# Patient Record
Sex: FEMALE | Race: BLACK OR AFRICAN AMERICAN | URBAN - NONMETROPOLITAN AREA
[De-identification: names, ages, dates, MRNs, and addresses within clinical notes are randomized per-mention and may not be internally consistent; named-entity substitution may affect disease eponyms.]

---

## 2019-05-23 ENCOUNTER — OFFICE VISIT (OUTPATIENT)
Dept: RHEUMATOLOGY | Age: 29
End: 2019-05-23
Payer: MEDICARE

## 2019-05-23 VITALS
WEIGHT: 153.4 LBS | HEART RATE: 76 BPM | SYSTOLIC BLOOD PRESSURE: 128 MMHG | HEIGHT: 67 IN | OXYGEN SATURATION: 100 % | BODY MASS INDEX: 24.08 KG/M2 | DIASTOLIC BLOOD PRESSURE: 86 MMHG

## 2019-05-23 DIAGNOSIS — M62.81 MUSCLE WEAKNESS: ICD-10-CM

## 2019-05-23 DIAGNOSIS — M25.50 POLYARTHRALGIA: Primary | ICD-10-CM

## 2019-05-23 DIAGNOSIS — R76.8 ANA POSITIVE: ICD-10-CM

## 2019-05-23 PROCEDURE — G8420 CALC BMI NORM PARAMETERS: HCPCS | Performed by: INTERNAL MEDICINE

## 2019-05-23 PROCEDURE — 99244 OFF/OP CNSLTJ NEW/EST MOD 40: CPT | Performed by: INTERNAL MEDICINE

## 2019-05-23 PROCEDURE — G8427 DOCREV CUR MEDS BY ELIG CLIN: HCPCS | Performed by: INTERNAL MEDICINE

## 2019-05-23 RX ORDER — M-VIT,TX,IRON,MINS/CALC/FOLIC 27MG-0.4MG
1 TABLET ORAL DAILY
COMMUNITY

## 2019-05-23 RX ORDER — LEVOTHYROXINE SODIUM 0.03 MG/1
50 TABLET ORAL DAILY
COMMUNITY

## 2019-05-23 ASSESSMENT — ENCOUNTER SYMPTOMS
SHORTNESS OF BREATH: 1
BLOOD IN STOOL: 0
COUGH: 0
WHEEZING: 0
CONSTIPATION: 0
DIARRHEA: 1
NAUSEA: 0
EYE PAIN: 1
CHOKING: 0
VOMITING: 0
EYE REDNESS: 1

## 2019-05-23 NOTE — PROGRESS NOTES
the stools. - cyanosis w/ paresthesias of hands and feet. Upon cold exposure. + hair loss -- scalp and eye brows    -denies Photosenstivity,  dry mouth/dry eyes, oral/nasal sores, digital ulcerations, skin tightening, renal disease,foamy urination,  sz's, blood clots, AIHA,leukpenia/lymphopenia, thrombocytopenia,serositis,        Cancer screening: up to date     Review of Systems    Review of Systems   Constitutional: Negative for diaphoresis, fatigue and fever. HENT: Positive for mouth sores and tinnitus. Negative for congestion, hearing loss and nosebleeds. Eyes: Positive for pain and redness. Dryness, itching   Respiratory: Positive for shortness of breath. Negative for cough, choking and wheezing. Cardiovascular: Positive for chest pain (Right sternal -- non-pleuritic, no position changes). Gastrointestinal: Positive for diarrhea. Negative for blood in stool, constipation, nausea and vomiting. Genitourinary: Negative for difficulty urinating, frequency and hematuria. Musculoskeletal: Positive for myalgias. Skin: Negative for rash. Neurological: Positive for weakness. Negative for dizziness and headaches. Psychiatric/Behavioral: Positive for agitation and sleep disturbance. The patient is nervous/anxious. PAST MEDICAL HISTORY      Past Medical History:   Diagnosis Date    Hypoglycemia     Hypothyroidism     Mixed connective tissue disease ([facility] [address]        PAST SURGICAL HISTORY      Past Surgical History:   Procedure Laterality Date    WISDOM TOOTH EXTRACTION         FAMILY HISTORY    No family history on file.     SOCIAL HISTORY      Social History     Tobacco History     Smoking Status  Current Some Day Smoker    Smokeless Tobacco Use  Never Used          Alcohol History     Alcohol Use Status  Yes Comment  occas          Drug Use     Drug Use Status  Yes Types  Marijuana          Sexual Activity     Sexually Active  Not Asked                ALLERGIES   No Known Allergies    CURRENT MEDICATIONS      Current Outpatient Medications   Medication Sig Dispense Refill    levothyroxine (LEVOXYL) 25 MCG tablet Take 25 mcg by mouth Daily      Multiple Vitamins-Minerals (THERAPEUTIC MULTIVITAMIN-MINERALS) tablet Take 1 tablet by mouth daily      Probiotic Product (PROBIOTIC ADVANCED PO) Take by mouth       No current facility-administered medications for this visit. PHYSICAL EXAMINATION / OBJECTIVE     Objective:  /86 (Site: Left Upper Arm, Position: Sitting, Cuff Size: Medium Adult)   Pulse 76   Ht 5' 7\" (1.702 m)   Wt 153 lb 6.4 oz (69.6 kg)   SpO2 100%   BMI 24.03 kg/m²     Physical Exam   Constitutional: She is oriented to person, place, and time. She appears well-developed and well-nourished. No distress. HENT:   Head: Normocephalic and atraumatic. Eyes: Conjunctivae and EOM are normal. No scleral icterus. Neck: Normal range of motion. Neck supple. Cardiovascular: Normal rate, regular rhythm and normal heart sounds. Pulmonary/Chest: Effort normal and breath sounds normal. No respiratory distress. She has no wheezes. She has no rales. Lymphadenopathy:     She has no cervical adenopathy. Neurological: She is alert and oriented to person, place, and time. Skin: Skin is warm and dry. She is not diaphoretic.   - Normal nail capillaries bilateral  - Scars along the bilateral forearms. Psychiatric: She has a normal mood and affect. Her behavior is normal.         Musculoskeletal:     Normal gait. Strength 5/5 in biceps, triceps, knees,  4/5 hip flexion/extension     Upper extremities:  ROM intact   SHOULDERS: + tender bilat. + b/l micheal, speed  ELBOWS: tender bilat.  + tinel testing bilat. WRISTS: + tender bilat   HANDS: + tender -- PIPs bilat.  Mild fullness right 2,3,4 PIP    Lower extremities: ROM  intact   HIPs tender bilat    KNEES : Negative bilateral medial/latearl compression, anterior/posterior draw, [facility] and patellar grind ASSESSMENT/PLAN    Assessment   Plan   There are no diagnoses linked to this encounter. Polyarthralgia  + PARAS : 1-160   - generalized arthralgia, myalgias, fatigue, tiredness. H/o \"tree in bud\" changes on lung, + fatigue, drymouth, dry eyes, vaginal dryness, + SOB, + chest pain, + 2 episodes of oral sores. Insomnia, depression anxiety, genearlized sorenses. Exam with fullness of right 2-4 PIPs. Diffused tenderness. - evaluation for SLE vs mctd vs other given her joint fullness and + PARAS   - cbc, cmp, esr w/o significnat abnoramlities. - + PARAS can be seen with Hypothyroidism.    - labs as below.    - [facility] labs , notes , and records summarized above. - requesting records from prior rheumatologist in [place]       ? raynauds   - reported cyanosis and paresthesia of hands and feet with cold exposure. Generalized weakness   - + fatigue able, worsened weakness with increase activity   -  Evaluation for inflammatory myopathy , myasthenia, cbc, cmp,    -  can be associated with hypothyroidism. Hypothyroidism    - TSH 7.82 on 5/1/19   - f/u with PCP for medication titration. AGARWAL:    - request PFT's from [address] [place] [address] pulmonology     - Fibromyalgia is a non-life threatening non-rheumatic disease and is a diagnosis of exclusion. We Discussed diagnosis, pathophysiology and management options with the patient. - I have discussed with her the importance of aerobic, non impact activity - swimming, yoga, inocencia chi, walking or bicycle as well as healthy diet and sleep hygiene. A graded exercise program with physical therapy  - We discussed  possible massage therapy and acupuncture. - Various FDA approved treatment such as low dose TCAs, SNRI (cymbalta, savella), gapenetoids, (neurotin, lyrica), flexeril, physical therapy with graded exercise program, cognitive behavioral thearpy. NoN-fda approved medications (muscle relaxants, ssri's) with the patient.   - Use of pain medications (opiods/narcotic) can sometimes cause hyperasthesia in patients with FMS and not part of the standard treatment guidelines. Return in about 3 months (around 8/23/2019). Electronically signed by Parminder Sweet DO on 5/23/2019 at 1:41 PM    New Prescriptions    No medications on file       Thank you for allowing me to participate in the care of this patient. Please call if there are any questions.

## 2019-05-24 LAB
C-REACTIVE PROTEIN: 0.2
TOTAL CK: 79 U/L
VITAMIN D 25-HYDROXY: 38
VITAMIN D2, 25 HYDROXY: NORMAL
VITAMIN D3,25 HYDROXY: NORMAL

## 2019-05-28 DIAGNOSIS — M25.50 POLYARTHRALGIA: ICD-10-CM

## 2019-05-30 NOTE — RESULT ENCOUNTER NOTE
Positive PARAS with of 1-3 20 titer and homogenous staining but negative JORGE L. Negative rheumatoid factor, negative CCP. Santo Baez on initiation of therapy    Asked patient to follow up with PCP for fibromyalgia management

## 2019-08-14 ENCOUNTER — TELEPHONE (OUTPATIENT)
Dept: RHEUMATOLOGY | Age: 29
End: 2019-08-14

## 2019-08-14 NOTE — TELEPHONE ENCOUNTER
Patient is scheduled to see Dr. Tina Cheney for a follow up on 8/26/19. She stated that at her last appointment she was referred to a GI doctor but she cannot get in to him until the end of September. She's asking if she should still keep her appointment with Dr. Tina Cheney if she hasn't been able to see GI yet? She travels almost an hour and it's an exhausting trip for her. Please advise.

## 2019-08-26 ENCOUNTER — OFFICE VISIT (OUTPATIENT)
Dept: RHEUMATOLOGY | Age: 29
End: 2019-08-26
Payer: MEDICARE

## 2019-08-26 VITALS
WEIGHT: 154 LBS | BODY MASS INDEX: 24.17 KG/M2 | DIASTOLIC BLOOD PRESSURE: 80 MMHG | SYSTOLIC BLOOD PRESSURE: 118 MMHG | HEIGHT: 67 IN | HEART RATE: 75 BPM | OXYGEN SATURATION: 100 %

## 2019-08-26 DIAGNOSIS — R76.8 ANA POSITIVE: Primary | ICD-10-CM

## 2019-08-26 DIAGNOSIS — M79.10 MYALGIA: ICD-10-CM

## 2019-08-26 DIAGNOSIS — M79.7 FIBROMYALGIA: ICD-10-CM

## 2019-08-26 PROCEDURE — G8427 DOCREV CUR MEDS BY ELIG CLIN: HCPCS | Performed by: INTERNAL MEDICINE

## 2019-08-26 PROCEDURE — G8420 CALC BMI NORM PARAMETERS: HCPCS | Performed by: INTERNAL MEDICINE

## 2019-08-26 PROCEDURE — 99214 OFFICE O/P EST MOD 30 MIN: CPT | Performed by: INTERNAL MEDICINE

## 2019-08-26 PROCEDURE — 4004F PT TOBACCO SCREEN RCVD TLK: CPT | Performed by: INTERNAL MEDICINE

## 2019-08-26 RX ORDER — NORELGESTROMIN AND ETHINYL ESTRADIOL 150; 35 UG/D; UG/D
PATCH TRANSDERMAL
Refills: 3 | COMMUNITY
Start: 2019-08-14 | End: 2020-06-04

## 2019-08-26 RX ORDER — CETIRIZINE HYDROCHLORIDE 10 MG/1
10 TABLET ORAL DAILY
COMMUNITY

## 2019-08-26 RX ORDER — GABAPENTIN 300 MG/1
CAPSULE ORAL
Refills: 1 | COMMUNITY
Start: 2019-08-15 | End: 2020-12-10 | Stop reason: ALTCHOICE

## 2019-08-26 RX ORDER — BUPROPION HYDROCHLORIDE 150 MG/1
TABLET, EXTENDED RELEASE ORAL
Refills: 3 | COMMUNITY
Start: 2019-08-15

## 2019-08-26 RX ORDER — TIZANIDINE 4 MG/1
4 TABLET ORAL NIGHTLY PRN
Qty: 30 TABLET | Refills: 1 | Status: SHIPPED | OUTPATIENT
Start: 2019-08-26 | End: 2020-06-04

## 2019-08-26 RX ORDER — IBUPROFEN 800 MG/1
TABLET ORAL
Refills: 1 | COMMUNITY
Start: 2019-08-15

## 2019-08-26 ASSESSMENT — ENCOUNTER SYMPTOMS
COUGH: 0
WHEEZING: 0
EYE PAIN: 1
NAUSEA: 0
CHOKING: 0
CONSTIPATION: 0
EYE REDNESS: 1
VOMITING: 0
BLOOD IN STOOL: 0
SHORTNESS OF BREATH: 1
DIARRHEA: 1

## 2019-08-26 NOTE — PROGRESS NOTES
disease,foamy urination,  sz's, blood clots, AIHA,leukpenia/lymphopenia, thrombocytopenia,serositis,        Review of Systems    Review of Systems   Constitutional: Negative for diaphoresis, fatigue and fever. HENT: Positive for mouth sores and tinnitus. Negative for congestion, hearing loss and nosebleeds. Eyes: Positive for pain and redness. Dryness, itching   Respiratory: Positive for shortness of breath. Negative for cough, choking and wheezing. Cardiovascular: Positive for chest pain (Right sternal -- non-pleuritic, no position changes). Gastrointestinal: Positive for diarrhea. Negative for blood in stool, constipation, nausea and vomiting. Genitourinary: Negative for difficulty urinating, frequency and hematuria. Musculoskeletal: Positive for myalgias. Skin: Negative for rash. Neurological: Positive for weakness. Negative for dizziness and headaches. Psychiatric/Behavioral: Positive for agitation and sleep disturbance. The patient is nervous/anxious. PAST MEDICAL HISTORY      Past Medical History:   Diagnosis Date    Hypoglycemia     Hypothyroidism     Mixed connective tissue disease ([facility] [address]        PAST SURGICAL HISTORY      Past Surgical History:   Procedure Laterality Date    WISDOM TOOTH EXTRACTION         FAMILY HISTORY    History reviewed. No pertinent family history.     SOCIAL HISTORY      Social History     Tobacco History     Smoking Status  Current Some Day Smoker    Smokeless Tobacco Use  Never Used          Alcohol History     Alcohol Use Status  Yes Comment  occas          Drug Use     Drug Use Status  Yes Types  Marijuana          Sexual Activity     Sexually Active  Not Asked                ALLERGIES   No Known Allergies    CURRENT MEDICATIONS      Current Outpatient Medications   Medication Sig Dispense Refill    buPROPion (WELLBUTRIN SR) 150 MG extended release tablet TAKE ONE TABLET BY MOUTH EVERY TWELVE HOURS  3    gabapentin (NEURONTIN) 300 MG capsule TAKE ONE CAPSULE BY MOUTH TWICE DAILY  1    ibuprofen (ADVIL;MOTRIN) 800 MG tablet TAKE ONE TABLET BY MOUTH EVERY 8 HOURS AS NEEDED  1    XULANE 150-35 MCG/24HR PLACE 1 PATCH ON SKIN EVERY 7 DAYS FOR 3 WEEKS FOR A TOTAL OF 21 DAYS  3    cetirizine (ZYRTEC) 10 MG tablet Take 10 mg by mouth daily      levothyroxine (LEVOXYL) 25 MCG tablet Take 25 mcg by mouth Daily      Multiple Vitamins-Minerals (THERAPEUTIC MULTIVITAMIN-MINERALS) tablet Take 1 tablet by mouth daily      Probiotic Product (PROBIOTIC ADVANCED PO) Take by mouth       No current facility-administered medications for this visit. PHYSICAL EXAMINATION / OBJECTIVE     Objective:  /80 (Site: Left Upper Arm, Position: Sitting, Cuff Size: Medium Adult)   Pulse 75   Ht 5' 7.01\" (1.702 m)   Wt 154 lb (69.9 kg)   SpO2 100%   BMI 24.11 kg/m²     Physical Exam   Constitutional: She is oriented to person, place, and time. She appears well-developed and well-nourished. No distress. HENT:   Head: Normocephalic and atraumatic. Eyes: Conjunctivae and EOM are normal. No scleral icterus. Neck: Normal range of motion. Neck supple. Cardiovascular: Normal rate, regular rhythm and normal heart sounds. Pulmonary/Chest: Effort normal and breath sounds normal. No respiratory distress. She has no wheezes. She has no rales. Lymphadenopathy:     She has no cervical adenopathy. Neurological: She is alert and oriented to person, place, and time. Skin: Skin is warm and dry. She is not diaphoretic.   - Normal nail capillaries bilateral  - Scars along the bilateral forearms. Psychiatric: She has a normal mood and affect. Her behavior is normal.         Musculoskeletal:     Normal gait. Strength 5/5 in biceps, triceps, knees,  4/5 hip flexion/extension     Upper extremities:  ROM intact   SHOULDERS: + tender bilat. + b/l micheal, speed  ELBOWS: tender bilat.  + tinel testing bilat.    WRISTS: + tender bilat   HANDS: + tender -- left 5th

## 2019-12-10 ENCOUNTER — TELEPHONE (OUTPATIENT)
Dept: RHEUMATOLOGY | Age: 29
End: 2019-12-10

## 2020-06-04 ENCOUNTER — OFFICE VISIT (OUTPATIENT)
Dept: RHEUMATOLOGY | Age: 30
End: 2020-06-04
Payer: MEDICARE

## 2020-06-04 ENCOUNTER — NURSE ONLY (OUTPATIENT)
Dept: LAB | Age: 30
End: 2020-06-04

## 2020-06-04 VITALS
WEIGHT: 180 LBS | HEART RATE: 96 BPM | DIASTOLIC BLOOD PRESSURE: 70 MMHG | HEIGHT: 67 IN | TEMPERATURE: 98.7 F | BODY MASS INDEX: 28.25 KG/M2 | OXYGEN SATURATION: 98 % | SYSTOLIC BLOOD PRESSURE: 110 MMHG

## 2020-06-04 LAB
ALBUMIN SERPL-MCNC: 4.5 G/DL (ref 3.5–5.1)
ALP BLD-CCNC: 46 U/L (ref 38–126)
ALT SERPL-CCNC: 40 U/L (ref 11–66)
ANION GAP SERPL CALCULATED.3IONS-SCNC: 9 MEQ/L (ref 8–16)
AST SERPL-CCNC: 42 U/L (ref 5–40)
BASOPHILS # BLD: 0.5 %
BASOPHILS ABSOLUTE: 0 THOU/MM3 (ref 0–0.1)
BILIRUB SERPL-MCNC: 0.2 MG/DL (ref 0.3–1.2)
BUN BLDV-MCNC: 13 MG/DL (ref 7–22)
C-REACTIVE PROTEIN: 0.04 MG/DL (ref 0–1)
CALCIUM SERPL-MCNC: 9.9 MG/DL (ref 8.5–10.5)
CHLORIDE BLD-SCNC: 101 MEQ/L (ref 98–111)
CO2: 28 MEQ/L (ref 23–33)
CREAT SERPL-MCNC: 0.8 MG/DL (ref 0.4–1.2)
EOSINOPHIL # BLD: 2.9 %
EOSINOPHILS ABSOLUTE: 0.2 THOU/MM3 (ref 0–0.4)
ERYTHROCYTE [DISTWIDTH] IN BLOOD BY AUTOMATED COUNT: 11.9 % (ref 11.5–14.5)
ERYTHROCYTE [DISTWIDTH] IN BLOOD BY AUTOMATED COUNT: 41.6 FL (ref 35–45)
GFR SERPL CREATININE-BSD FRML MDRD: 84 ML/MIN/1.73M2
GLUCOSE BLD-MCNC: 81 MG/DL (ref 70–108)
HCT VFR BLD CALC: 40.4 % (ref 37–47)
HEMOGLOBIN: 13.4 GM/DL (ref 12–16)
IMMATURE GRANS (ABS): 0.03 THOU/MM3 (ref 0–0.07)
IMMATURE GRANULOCYTES: 0.4 %
LYMPHOCYTES # BLD: 36.8 %
LYMPHOCYTES ABSOLUTE: 2.9 THOU/MM3 (ref 1–4.8)
MCH RBC QN AUTO: 31.7 PG (ref 26–33)
MCHC RBC AUTO-ENTMCNC: 33.2 GM/DL (ref 32.2–35.5)
MCV RBC AUTO: 95.5 FL (ref 81–99)
MONOCYTES # BLD: 6.8 %
MONOCYTES ABSOLUTE: 0.5 THOU/MM3 (ref 0.4–1.3)
NUCLEATED RED BLOOD CELLS: 0 /100 WBC
PLATELET # BLD: 339 THOU/MM3 (ref 130–400)
PMV BLD AUTO: 9.6 FL (ref 9.4–12.4)
POTASSIUM SERPL-SCNC: 4.1 MEQ/L (ref 3.5–5.2)
RBC # BLD: 4.23 MILL/MM3 (ref 4.2–5.4)
SEDIMENTATION RATE, ERYTHROCYTE: 12 MM/HR (ref 0–20)
SEG NEUTROPHILS: 52.6 %
SEGMENTED NEUTROPHILS ABSOLUTE COUNT: 4.1 THOU/MM3 (ref 1.8–7.7)
SODIUM BLD-SCNC: 138 MEQ/L (ref 135–145)
TOTAL PROTEIN: 7.6 G/DL (ref 6.1–8)
WBC # BLD: 7.8 THOU/MM3 (ref 4.8–10.8)

## 2020-06-04 PROCEDURE — 4004F PT TOBACCO SCREEN RCVD TLK: CPT | Performed by: INTERNAL MEDICINE

## 2020-06-04 PROCEDURE — 99213 OFFICE O/P EST LOW 20 MIN: CPT | Performed by: INTERNAL MEDICINE

## 2020-06-04 PROCEDURE — G8419 CALC BMI OUT NRM PARAM NOF/U: HCPCS | Performed by: INTERNAL MEDICINE

## 2020-06-04 PROCEDURE — G8427 DOCREV CUR MEDS BY ELIG CLIN: HCPCS | Performed by: INTERNAL MEDICINE

## 2020-06-04 RX ORDER — AMITRIPTYLINE HYDROCHLORIDE 25 MG/1
TABLET, FILM COATED ORAL
COMMUNITY
Start: 2020-04-09 | End: 2020-12-10 | Stop reason: ALTCHOICE

## 2020-06-04 ASSESSMENT — ENCOUNTER SYMPTOMS
WHEEZING: 0
EYE PAIN: 1
DIARRHEA: 1
VOMITING: 0
NAUSEA: 0
BLOOD IN STOOL: 0
COUGH: 0
SHORTNESS OF BREATH: 1
CHOKING: 0
EYE REDNESS: 1
CONSTIPATION: 0

## 2020-06-04 NOTE — PROGRESS NOTES
in bud\" changes on lung, + fatigue, drymouth, dry eyes, vaginal dryness, + SOB, + chest pain, + 2 episodes of oral sores. Insomnia, depression anxiety, genearlized sorenses. . Diffuse tenderness. - no overt features on exam of an active inflammatory process at this time. Fibromyalgia    - generalized myofascial pain, arthralgia, fatigue, insomnia, anxiety/depression   - no overt evidence of an underlying inflammatory arthritis. - f/u with PCP with titration of therapy . ? Raynaud's - reported cyanosis and paresthesia of hands and feet with cold exposure. Generalized weakness   - + fatigue able, worsened weakness with increase activity   - NORMAL ck    Hypothyroidism    - TSH 7.82 on 5/1/19   - f/u with PCP for medication titration.     - Fibromyalgia    - encourage pt to exercise on 30-45 minutes   - PRIOR TX: flexeril 10mg bid, Naproxen    - gabapentin 300mg bid. (from PCP)   - start tizanidine 4mg qhs prn.    - defer further medication titration to PCP     No follow-ups on file. Electronically signed by Liana Ruff DO on 6/4/2020 at 3:37 PM    New Prescriptions    No medications on file       Thank you for allowing me to participate in the care of this patient. Please call if there are any questions. Addendum: 6/5/2020   LFT elevation:  Question hepatic etiology, muscle inflammation versus other. Hepatitis panel, GGT, CK, aldolase ordered.

## 2020-06-08 ENCOUNTER — TELEPHONE (OUTPATIENT)
Dept: RHEUMATOLOGY | Age: 30
End: 2020-06-08

## 2020-06-08 NOTE — TELEPHONE ENCOUNTER
Phoned her and informed. She voiced understanding. And request orders faxed to [facility]. Orders faxed.

## 2020-06-09 LAB — TOTAL CK: 150 U/L

## 2020-06-17 ENCOUNTER — TELEPHONE (OUTPATIENT)
Dept: RHEUMATOLOGY | Age: 30
End: 2020-06-17

## 2020-06-17 NOTE — TELEPHONE ENCOUNTER
----- Message from Conchetta Halsted, DO sent at 6/16/2020  5:11 PM EDT -----  Blood testing to evaluate the mild elevation of liver function tests were unrevealing. At this time would keep follow-up appointment have labs reevaluated at that time.

## 2020-06-17 NOTE — TELEPHONE ENCOUNTER
Phoned and informed. She voiced understanding.  She states her PCP checked her TSH again and it was abnormal; they increased her levothyroxine to 50mcg

## 2020-10-06 ENCOUNTER — TELEPHONE (OUTPATIENT)
Dept: RHEUMATOLOGY | Age: 30
End: 2020-10-06

## 2020-10-06 NOTE — TELEPHONE ENCOUNTER
Ligia has some info for Dr Rand Roles  1) she got the x-ray on her pelvis [facility]), has been seeing Dr Ofelia Crabtree, chiropractor.   2) She's seeing Dr Alonzo Curry for an EMG 10/13/20

## 2020-10-13 ENCOUNTER — PROCEDURE VISIT (OUTPATIENT)
Dept: NEUROLOGY | Age: 30
End: 2020-10-13
Payer: MEDICARE

## 2020-10-13 PROCEDURE — 95886 MUSC TEST DONE W/N TEST COMP: CPT | Performed by: PSYCHIATRY & NEUROLOGY

## 2020-10-13 PROCEDURE — 95911 NRV CNDJ TEST 9-10 STUDIES: CPT | Performed by: PSYCHIATRY & NEUROLOGY

## 2020-12-10 ENCOUNTER — OFFICE VISIT (OUTPATIENT)
Dept: RHEUMATOLOGY | Age: 30
End: 2020-12-10
Payer: MEDICARE

## 2020-12-10 VITALS
WEIGHT: 179.8 LBS | SYSTOLIC BLOOD PRESSURE: 122 MMHG | DIASTOLIC BLOOD PRESSURE: 74 MMHG | OXYGEN SATURATION: 99 % | HEIGHT: 67 IN | HEART RATE: 74 BPM | TEMPERATURE: 97.8 F | BODY MASS INDEX: 28.22 KG/M2

## 2020-12-10 PROCEDURE — 99213 OFFICE O/P EST LOW 20 MIN: CPT | Performed by: INTERNAL MEDICINE

## 2020-12-10 PROCEDURE — G8484 FLU IMMUNIZE NO ADMIN: HCPCS | Performed by: INTERNAL MEDICINE

## 2020-12-10 PROCEDURE — G8419 CALC BMI OUT NRM PARAM NOF/U: HCPCS | Performed by: INTERNAL MEDICINE

## 2020-12-10 PROCEDURE — G8427 DOCREV CUR MEDS BY ELIG CLIN: HCPCS | Performed by: INTERNAL MEDICINE

## 2020-12-10 PROCEDURE — 1036F TOBACCO NON-USER: CPT | Performed by: INTERNAL MEDICINE

## 2020-12-10 RX ORDER — TIZANIDINE 4 MG/1
4 TABLET ORAL EVERY 6 HOURS PRN
COMMUNITY

## 2020-12-10 RX ORDER — TRAZODONE HYDROCHLORIDE 100 MG/1
100 TABLET ORAL NIGHTLY
COMMUNITY

## 2020-12-10 SDOH — HEALTH STABILITY: MENTAL HEALTH: HOW OFTEN DO YOU HAVE A DRINK CONTAINING ALCOHOL?: MONTHLY OR LESS

## 2020-12-10 ASSESSMENT — ENCOUNTER SYMPTOMS
WHEEZING: 0
DIARRHEA: 1
VOMITING: 0
CHOKING: 0
SHORTNESS OF BREATH: 1
CONSTIPATION: 0
BLOOD IN STOOL: 0
COUGH: 0
EYE PAIN: 1
NAUSEA: 0
EYE REDNESS: 1

## 2020-12-10 NOTE — PROGRESS NOTES
[address] [facility] follow up evaluation        Date Of Service: 12/10/2020  Provider: Kathie Daly DO    Name: Moris Bloom   MRN: [id number]    Chief Complaint(s)      Chief Complaint   Patient presents with    6 Month Follow-Up     PARAS +     History of Present illness (HPI)    Moris Bloom   is a(n)30 y.o. female with a reported hx of fibromyalgia, MCTD, hypothyroidism, hypoglycemia, HSV I & II, Anemia, HTN here for the f/u evaluation of her   + polyarthralgia , + PARAS , fibromyalgia      EMG/NCV -diagnosed with mild to moderate bilateral carpal tunnel - cont paresthesia of fingers. Fibromyalgia / lower back pain  - ongoing generalized pain, hands, wrists, elbows, shoulder, hips, knees, ankles, neck and back. Pain up to 7.5/10 , greatest in the mornings. Aggravating with cold weathers, sitting on hard surfaces. Upper extremities - increased use. releif w/ hot bath. Has increased exercise with improvement of leg strenght. Medications changes - gabapentin stopped b/c brain fog. , elavil stopped. PCP started on flexeril and trazadone. AM stiffness lasting 2-3 hour in the lower back improved w/ movement. Previous therapy: physical therapy, tanning, diclofenac, naproxen, VIibryd, medrol -- worsened pain. Other medications. chiropractor and Deep tissue massage. , gabapentin, elavil. Review of Systems    Review of Systems   Constitutional: Negative for diaphoresis, fatigue and fever. HENT: Positive for mouth sores and tinnitus. Negative for congestion, hearing loss and nosebleeds. Eyes: Positive for pain and redness. Respiratory: Positive for shortness of breath. Negative for cough, choking and wheezing. Cardiovascular: Negative for chest pain. Gastrointestinal: Positive for diarrhea. Negative for blood in stool, constipation, nausea and vomiting. Genitourinary: Negative for difficulty urinating, dysuria, frequency, genital sores and hematuria.    Musculoskeletal: Positive for myalgias. Skin: Negative for rash. Neurological: Positive for weakness. Negative for dizziness and headaches. Psychiatric/Behavioral: Positive for agitation and sleep disturbance. The patient is nervous/anxious. PAST MEDICAL HISTORY      Past Medical History:   Diagnosis Date    Acute carpal tunnel syndrome 10/2020    Hypoglycemia     Hypothyroidism     Mixed connective tissue disease ([facility] [address]        PAST SURGICAL HISTORY      Past Surgical History:   Procedure Laterality Date    WISDOM TOOTH EXTRACTION         FAMILY HISTORY    No family history on file. SOCIAL HISTORY      Social History     Tobacco History     Smoking Status  Current Some Day Smoker    Smokeless Tobacco Use  Never Used          Alcohol History     Alcohol Use Status  Yes Comment  occas          Drug Use     Drug Use Status  Yes Types  Marijuana          Sexual Activity     Sexually Active  Not Asked                ALLERGIES   No Known Allergies    CURRENT MEDICATIONS      Current Outpatient Medications   Medication Sig Dispense Refill    tiZANidine (ZANAFLEX) 4 MG tablet Take 4 mg by mouth every 6 hours as needed      traZODone (DESYREL) 100 MG tablet Take 100 mg by mouth nightly      ibuprofen (ADVIL;MOTRIN) 800 MG tablet TAKE ONE TABLET BY MOUTH EVERY 8 HOURS AS NEEDED  1    cetirizine (ZYRTEC) 10 MG tablet Take 10 mg by mouth daily      levothyroxine (LEVOXYL) 25 MCG tablet Take 50 mcg by mouth Daily       Multiple Vitamins-Minerals (THERAPEUTIC MULTIVITAMIN-MINERALS) tablet Take 1 tablet by mouth daily      Probiotic Product (PROBIOTIC ADVANCED PO) Take by mouth      amitriptyline (ELAVIL) 25 MG tablet TAKE ONE TABLET BY MOUTH DAILY AT BEDTIME      buPROPion (WELLBUTRIN SR) 150 MG extended release tablet TAKE ONE TABLET BY MOUTH EVERY TWELVE HOURS  3    gabapentin (NEURONTIN) 300 MG capsule TAKE ONE CAPSULE BY MOUTH TWICE DAILY  1     No current facility-administered medications for this visit. depression anxiety, genearlized sorenses. . Diffuse tenderness. - no overt features on exam of an active inflammatory process at this time. - x-ray evaluation of lumbar spine for possible inflammatory changes --- if negative will d/c back to PCP and f/u as needed. Fibromyalgia    - generalized myofascial pain, arthralgia, fatigue, insomnia, anxiety/depression   - no overt evidence of an underlying inflammatory arthritis. - f/u with PCP with titration of therapy . Generalized weakness   - + fatigue able, worsened weakness with increase activity   - NORMAL ck    Carpal tunnel - scheduled for orthopedic evaluation 12/11/2020     LFT - re-evaluation ordered. ? Medication related. - Fibromyalgia    - encourage pt to exercise on 30-45 minutes   - PRIOR TX: flexeril 10mg bid, Naproxen    - defer further medication titration to PCP     No follow-ups on file. Electronically signed by Hermelinda Najera DO on 12/10/2020 at 3:20 PM    New Prescriptions    No medications on file       Thank you for allowing me to participate in the care of this patient. Please call if there are any questions. Addendum: 12/10/2020   LFT elevation:  Question hepatic etiology, muscle inflammation versus other. Hepatitis panel, GGT, CK, aldolase ordered.

## 2020-12-11 LAB
A/G RATIO: 1
ALBUMIN SERPL-MCNC: 3.5 G/DL
ALP BLD-CCNC: 48 U/L
ALT SERPL-CCNC: 21 U/L
AST SERPL-CCNC: 17 U/L
BILIRUB SERPL-MCNC: 0.3 MG/DL (ref 0.1–1.4)
BILIRUBIN DIRECT: 0.1 MG/DL
BILIRUBIN, INDIRECT: 0.2
GLOBULIN: 3.6
PROTEIN TOTAL: 7.1 G/DL

## 2020-12-15 ENCOUNTER — TELEPHONE (OUTPATIENT)
Dept: RHEUMATOLOGY | Age: 30
End: 2020-12-15

## 2020-12-15 NOTE — TELEPHONE ENCOUNTER
----- Message from Abel Valdovinos DO sent at 12/15/2020 11:43 AM EST -----  Liver function test without abnormalities. X-ray of the lower back without significant abnormalities.   At this time will discharge back to PCP for treatment

## 2021-05-20 ENCOUNTER — PROCEDURE VISIT (OUTPATIENT)
Dept: NEUROLOGY | Age: 31
End: 2021-05-20
Payer: MEDICARE

## 2021-05-20 DIAGNOSIS — M79.604 RIGHT LEG PAIN: Primary | ICD-10-CM

## 2021-05-20 PROCEDURE — 95908 NRV CNDJ TST 3-4 STUDIES: CPT | Performed by: PSYCHIATRY & NEUROLOGY

## 2021-05-20 PROCEDURE — 95886 MUSC TEST DONE W/N TEST COMP: CPT | Performed by: PSYCHIATRY & NEUROLOGY

## 2023-05-09 NOTE — RESULT ENCOUNTER NOTE
Liver function test without abnormalities. X-ray of the lower back without significant abnormalities.   At this time will discharge back to PCP for treatment Pt alert and oriented, answering all questions appropriately. VSS, pt on room air. Drinking without any issues at this time.